# Patient Record
(demographics unavailable — no encounter records)

---

## 2025-04-28 NOTE — PHYSICAL EXAM
[No Acute Distress] : no acute distress [Normal Oropharynx] : normal oropharynx [Normal Appearance] : normal appearance [No Neck Mass] : no neck mass [Normal Rate/Rhythm] : normal rate/rhythm [Normal S1, S2] : normal s1, s2 [No Murmurs] : no murmurs [No Resp Distress] : no resp distress [Rales] : rales [Rhonchi] : rhonchi [No Abnormalities] : no abnormalities [Benign] : benign [Normal Gait] : normal gait [No Clubbing] : no clubbing [No Cyanosis] : no cyanosis [No Edema] : no edema [FROM] : FROM [Normal Color/ Pigmentation] : normal color/ pigmentation [No Focal Deficits] : no focal deficits [Oriented x3] : oriented x3 [Normal Affect] : normal affect

## 2025-04-28 NOTE — CONSULT LETTER
[Dear  ___] : Dear  [unfilled], [Courtesy Letter:] : I had the pleasure of seeing your patient, [unfilled], in my office today. [Please see my note below.] : Please see my note below. [Consult Closing:] : Thank you very much for allowing me to participate in the care of this patient.  If you have any questions, please do not hesitate to contact me. [Sincerely,] : Sincerely, [FreeTextEntry3] : MUMTAZ MONTERO MD  [address], [address] [place] [address]

## 2025-04-28 NOTE — HISTORY OF PRESENT ILLNESS
[Never] : never [TextBox_4] : 88-year-old female with history of bronchiectasis, last seen by me March 2020, presents for follow-up.  Patient was COVID-19 positive 1-month ago ,treated symptomatically at home.  Presently she complains of occasional productive cough without fever, chills, chest pain, hemoptysis, night sweats or weight loss. [TextBox_29] : Denies snoring, daytime somnolence, apneic episodes, AM headaches

## 2025-04-28 NOTE — REVIEW OF SYSTEMS
[Cough] : cough [Sputum] : sputum [Negative] : Endocrine [Postnasal Drip] : no postnasal drip [Dyspnea] : no dyspnea

## 2025-04-28 NOTE — CONSULT LETTER
[Dear  ___] : Dear  [unfilled], [Courtesy Letter:] : I had the pleasure of seeing your patient, [unfilled], in my office today. [Please see my note below.] : Please see my note below. [Consult Closing:] : Thank you very much for allowing me to participate in the care of this patient.  If you have any questions, please do not hesitate to contact me. [Sincerely,] : Sincerely, [FreeTextEntry3] : MUMTAZ MONTERO MD  [address], [address] [place] [address]